# Patient Record
Sex: MALE | Race: WHITE | Employment: FULL TIME | RURAL
[De-identification: names, ages, dates, MRNs, and addresses within clinical notes are randomized per-mention and may not be internally consistent; named-entity substitution may affect disease eponyms.]

---

## 2017-03-27 RX ORDER — BLOOD SUGAR DIAGNOSTIC
STRIP MISCELLANEOUS
Qty: 100 EACH | Refills: 3 | Status: SHIPPED | OUTPATIENT
Start: 2017-03-27

## 2017-05-09 ENCOUNTER — TELEPHONE (OUTPATIENT)
Dept: FAMILY MEDICINE CLINIC | Age: 52
End: 2017-05-09

## 2020-07-29 ENCOUNTER — APPOINTMENT (OUTPATIENT)
Dept: PREADMISSION TESTING | Facility: HOSPITAL | Age: 55
End: 2020-07-29

## 2020-07-29 ENCOUNTER — TRANSCRIBE ORDERS (OUTPATIENT)
Dept: ADMINISTRATIVE | Facility: HOSPITAL | Age: 55
End: 2020-07-29

## 2020-07-29 DIAGNOSIS — Z01.818 PRE-OP TESTING: Primary | ICD-10-CM

## 2024-03-20 ENCOUNTER — TELEPHONE (OUTPATIENT)
Dept: NEUROLOGY | Age: 59
End: 2024-03-20

## 2024-03-20 NOTE — TELEPHONE ENCOUNTER
Received a referral from [facility] for this patient. Called patient to see about getting him scheduled an appointment with Dr. Lala. No answer. VM came on but was not able to leave a VM.

## 2024-07-02 ENCOUNTER — OFFICE VISIT (OUTPATIENT)
Dept: NEUROLOGY | Age: 59
End: 2024-07-02
Payer: COMMERCIAL

## 2024-07-02 VITALS
HEART RATE: 78 BPM | BODY MASS INDEX: 26.52 KG/M2 | WEIGHT: 175 LBS | DIASTOLIC BLOOD PRESSURE: 86 MMHG | HEIGHT: 68 IN | SYSTOLIC BLOOD PRESSURE: 149 MMHG

## 2024-07-02 DIAGNOSIS — G62.9 NEUROPATHY: Primary | ICD-10-CM

## 2024-07-02 DIAGNOSIS — M79.671 PAIN IN BOTH FEET: ICD-10-CM

## 2024-07-02 DIAGNOSIS — G62.9 NEUROPATHY: ICD-10-CM

## 2024-07-02 DIAGNOSIS — M79.672 PAIN IN BOTH FEET: ICD-10-CM

## 2024-07-02 DIAGNOSIS — R20.0 NUMBNESS: ICD-10-CM

## 2024-07-02 LAB
ERYTHROCYTE [SEDIMENTATION RATE] IN BLOOD BY WESTERGREN METHOD: 8 MM/HR (ref 0–15)
RHEUMATOID FACT SER NEPH-ACNC: <10 IU/ML
VIT B12 SERPL-MCNC: 476 PG/ML (ref 211–946)

## 2024-07-02 PROCEDURE — 3077F SYST BP >= 140 MM HG: CPT | Performed by: PSYCHIATRY & NEUROLOGY

## 2024-07-02 PROCEDURE — 3079F DIAST BP 80-89 MM HG: CPT | Performed by: PSYCHIATRY & NEUROLOGY

## 2024-07-02 PROCEDURE — 99204 OFFICE O/P NEW MOD 45 MIN: CPT | Performed by: PSYCHIATRY & NEUROLOGY

## 2024-07-02 RX ORDER — LOSARTAN POTASSIUM 50 MG/1
50 TABLET ORAL DAILY
COMMUNITY

## 2024-07-02 RX ORDER — OXCARBAZEPINE 150 MG/1
TABLET, FILM COATED ORAL
Qty: 120 TABLET | Refills: 5 | Status: SHIPPED | OUTPATIENT
Start: 2024-07-02

## 2024-07-02 RX ORDER — SITAGLIPTIN 50 MG/1
50 TABLET, FILM COATED ORAL DAILY
COMMUNITY
Start: 2024-05-10

## 2024-07-02 NOTE — PROGRESS NOTES
Chief Complaint   Patient presents with    New Patient     [facility]        Aroldo Hackett is a 59 y.o. year old male who is seen for evaluation of numbness in the feet.  This has been present about 2 or 3 years.  She does have a history of diabetes with last 10 to 20 years.  He complains of burning and hot feeling in his feet mostly in the soles of the feet.  He denies any weakness.  There is no involvement of the hands.  He has some mild back pain.  He denies any family history of neuropathy.  He denies alcohol use.  He denies any exposure to chemicals other than possible exposure to chemicals during his  service in [place].  His pain scale is typically 3-4 out of 10.  If his bedsheets touch his feet or someone stepped on his feet the pain can be quite severe.  He has not taken any medications for neuropathy previously.    Active Ambulatory Problems     Diagnosis Date Noted    HTN (hypertension) 11/01/2011    Hypogonadism male 02/08/2013    Neuropathy 07/02/2024    Pain in both feet 07/02/2024    Numbness 07/02/2024     Resolved Ambulatory Problems     Diagnosis Date Noted    No Resolved Ambulatory Problems     Past Medical History:   Diagnosis Date    GERD (gastroesophageal reflux disease)     Hypertension     Psoriasis        No past surgical history on file.    No family history on file.    Allergies   Allergen Reactions    Bacitracin Other (See Comments)    Neosporin [Neomycin-Polymyx-Gramicid]        Social History     Socioeconomic History    Marital status:      Spouse name: Not on file    Number of children: Not on file    Years of education: Not on file    Highest education level: Not on file   Occupational History    Not on file   Tobacco Use    Smoking status: Former    Smokeless tobacco: Never   Substance and Sexual Activity    Alcohol use: No    Drug use: No    Sexual activity: Not on file   Other Topics Concern    Not on file   Social History Narrative    Not on file

## 2024-07-04 LAB
ARSENIC BLD-MCNC: <10 UG/L
C3 SERPL-MCNC: 140 MG/DL (ref 90–180)
C4 SERPL-MCNC: 17 MG/DL (ref 10–40)
CADMIUM BLD-MCNC: <1 UG/L
DEPRECATED KAPPA LC FREE/LAMBDA SER: 1.32 {RATIO} (ref 0.26–1.65)
KAPPA LC FREE SER-MCNC: 15.87 MG/L (ref 3.3–19.4)
LAMBDA LC FREE SERPL-MCNC: 12.02 MG/L (ref 5.71–26.3)
LEAD BLD-MCNC: <2 UG/DL
MERCURY BLD-MCNC: <2.5 UG/L
NUCLEAR IGG SER QL IA: NORMAL
RHEUMATOID FACT SER NEPH-ACNC: <10 IU/ML (ref 0–14)
TTG IGA SER IA-ACNC: <1.02 FLU (ref 0–4.99)
ZINC SERPL-MCNC: 81 UG/DL (ref 60–120)

## 2024-07-05 LAB
ALBUMIN SERPL-MCNC: 4.43 G/DL (ref 3.75–5.01)
ALPHA1 GLOB SERPL ELPH-MCNC: 0.26 G/DL (ref 0.19–0.46)
ALPHA2 GLOB SERPL ELPH-MCNC: 0.72 G/DL (ref 0.48–1.05)
B BURGDOR DNA SPEC QL NAA+PROBE: NOT DETECTED
B BURGDOR IGG SER QL IB: NEGATIVE
B BURGDOR IGM SER QL IB: NEGATIVE
B-GLOBULIN SERPL ELPH-MCNC: 0.85 G/DL (ref 0.48–1.1)
GAMMA GLOB SERPL ELPH-MCNC: 0.94 G/DL (ref 0.62–1.51)
INTERPRETATION SERPL IFE-IMP: NORMAL
METHYLMALONATE SERPL-SCNC: <0.1 UMOL/L (ref 0–0.4)
MONOCLON BAND OBS SERPL: NORMAL
PROT SERPL-MCNC: 7.2 G/DL (ref 6.3–8.2)
SPECIMEN SOURCE: NORMAL

## 2025-01-15 ENCOUNTER — OFFICE VISIT (OUTPATIENT)
Dept: NEUROLOGY | Age: 60
End: 2025-01-15
Payer: COMMERCIAL

## 2025-01-15 VITALS
DIASTOLIC BLOOD PRESSURE: 93 MMHG | WEIGHT: 175 LBS | BODY MASS INDEX: 26.52 KG/M2 | SYSTOLIC BLOOD PRESSURE: 140 MMHG | HEART RATE: 90 BPM | HEIGHT: 68 IN

## 2025-01-15 DIAGNOSIS — R20.0 NUMBNESS: ICD-10-CM

## 2025-01-15 DIAGNOSIS — M79.672 PAIN IN BOTH FEET: ICD-10-CM

## 2025-01-15 DIAGNOSIS — G62.9 NEUROPATHY: Primary | ICD-10-CM

## 2025-01-15 DIAGNOSIS — M79.671 PAIN IN BOTH FEET: ICD-10-CM

## 2025-01-15 PROCEDURE — 99214 OFFICE O/P EST MOD 30 MIN: CPT | Performed by: PSYCHIATRY & NEUROLOGY

## 2025-01-15 PROCEDURE — 3077F SYST BP >= 140 MM HG: CPT | Performed by: PSYCHIATRY & NEUROLOGY

## 2025-01-15 PROCEDURE — 3080F DIAST BP >= 90 MM HG: CPT | Performed by: PSYCHIATRY & NEUROLOGY

## 2025-01-15 RX ORDER — PIOGLITAZONE 30 MG/1
30 TABLET ORAL DAILY
COMMUNITY
Start: 2024-10-29

## 2025-01-15 RX ORDER — GABAPENTIN 300 MG/1
300 CAPSULE ORAL 2 TIMES DAILY
Qty: 60 CAPSULE | Refills: 5 | Status: SHIPPED | OUTPATIENT
Start: 2025-01-15 | End: 2025-02-14

## 2025-01-15 NOTE — PROGRESS NOTES
Chief Complaint   Patient presents with    Follow-up       Aroldo Hackett is a 59 y.o. year old male who is seen for evaluation of numbness in the feet.  This has been present about 2 or 3 years. He does have a history of diabetes with last 10 to 20 years.  He complains of burning and hot feeling in his feet mostly in the soles of the feet.  He denies any weakness.  There is no involvement of the hands.  He has some mild back pain.  He denies any family history of neuropathy.  He denies alcohol use.  He denies any exposure to chemicals other than possible exposure to chemicals during his  service in [place].  His pain scale is typically 3-4 out of 10.  If his bedsheets touch his feet or someone stepped on his feet the pain can be quite severe.  He has not taken any medications for neuropathy previously. Trileptal not helping,.     Active Ambulatory Problems     Diagnosis Date Noted    HTN (hypertension) 11/01/2011    Hypogonadism male 02/08/2013    Neuropathy 07/02/2024    Pain in both feet 07/02/2024    Numbness 07/02/2024     Resolved Ambulatory Problems     Diagnosis Date Noted    No Resolved Ambulatory Problems     Past Medical History:   Diagnosis Date    GERD (gastroesophageal reflux disease)     Hypertension     Psoriasis        No past surgical history on file.    No family history on file.    Allergies   Allergen Reactions    Bacitracin Other (See Comments)    Neosporin [Neomycin-Polymyx-Gramicid]        Social History     Socioeconomic History    Marital status:      Spouse name: Not on file    Number of children: Not on file    Years of education: Not on file    Highest education level: Not on file   Occupational History    Not on file   Tobacco Use    Smoking status: Former    Smokeless tobacco: Never   Substance and Sexual Activity    Alcohol use: No    Drug use: No    Sexual activity: Not on file   Other Topics Concern    Not on file   Social History Narrative    Not on file     Social

## 2025-01-24 RX ORDER — OXCARBAZEPINE 150 MG/1
TABLET, FILM COATED ORAL
Qty: 120 TABLET | Refills: 5 | Status: SHIPPED | OUTPATIENT
Start: 2025-01-24

## 2025-01-24 NOTE — TELEPHONE ENCOUNTER
Requested Prescriptions     Pending Prescriptions Disp Refills    OXcarbazepine (TRILEPTAL) 150 MG tablet [Pharmacy Med Name: OXCARBAZEPINE 150MG TABLETS] 120 tablet 5     Sig: TAKE 2 TABLETS BY MOUTH TWICE DAILY       Last Office Visit: 1/15/2025  Next Office Visit: 5/15/2025  Last Medication Refill: 7/2/24 with 5 rfs     Dr. Lala pt